# Patient Record
Sex: MALE | Race: WHITE
[De-identification: names, ages, dates, MRNs, and addresses within clinical notes are randomized per-mention and may not be internally consistent; named-entity substitution may affect disease eponyms.]

---

## 2022-01-23 ENCOUNTER — HOSPITAL ENCOUNTER (EMERGENCY)
Dept: HOSPITAL 94 - ER | Age: 32
Discharge: LEFT BEFORE DISCHARGE | End: 2022-01-23
Payer: MEDICAID

## 2022-01-23 DIAGNOSIS — Z72.89: ICD-10-CM
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
## 2022-01-23 DIAGNOSIS — T16.2XXA: Primary | ICD-10-CM
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
## 2022-01-23 DIAGNOSIS — T16.1XXA: ICD-10-CM

## 2022-01-23 DIAGNOSIS — Z59.00: ICD-10-CM
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
## 2022-01-23 PROCEDURE — 99281 EMR DPT VST MAYX REQ PHY/QHP: CPT

## 2022-01-23 SDOH — ECONOMIC STABILITY - HOUSING INSECURITY: HOMELESSNESS UNSPECIFIED: Z59.00

## 2022-02-06 ENCOUNTER — HOSPITAL ENCOUNTER (EMERGENCY)
Dept: HOSPITAL 94 - ER | Age: 32
LOS: 1 days | Discharge: HOME | End: 2022-02-07
Payer: MEDICAID

## 2022-02-06 VITALS — WEIGHT: 143.74 LBS | BODY MASS INDEX: 21.78 KG/M2 | HEIGHT: 68 IN

## 2022-02-06 DIAGNOSIS — Z59.00: ICD-10-CM

## 2022-02-06 DIAGNOSIS — F17.210: ICD-10-CM

## 2022-02-06 DIAGNOSIS — Z20.822: ICD-10-CM

## 2022-02-06 DIAGNOSIS — F20.9: Primary | ICD-10-CM

## 2022-02-06 LAB
ALBUMIN SERPL BCP-MCNC: 4.5 G/DL (ref 3.4–5)
ALBUMIN/GLOB SERPL: 1.6 {RATIO} (ref 1.1–1.5)
ALP SERPL-CCNC: 83 IU/L (ref 46–116)
ALT SERPL W P-5'-P-CCNC: 28 U/L (ref 12–78)
AMPHETAMINES UR QL SCN: POSITIVE
ANION GAP SERPL CALCULATED.3IONS-SCNC: 9 MMOL/L (ref 8–16)
AST SERPL W P-5'-P-CCNC: 18 U/L (ref 10–37)
BARBITURATES UR QL SCN: NEGATIVE
BASOPHILS # BLD AUTO: 0.1 X10'3 (ref 0–0.2)
BASOPHILS NFR BLD AUTO: 0.7 % (ref 0–1)
BENZODIAZ UR QL SCN: NEGATIVE
BILIRUB SERPL-MCNC: 0.7 MG/DL (ref 0.1–1)
BUN SERPL-MCNC: 12 MG/DL (ref 7–18)
BUN/CREAT SERPL: 15 (ref 5.4–32)
BZE UR QL SCN: NEGATIVE
CALCIUM SERPL-MCNC: 9 MG/DL (ref 8.5–10.1)
CANNABINOIDS UR QL SCN: POSITIVE
CHLORIDE SERPL-SCNC: 104 MMOL/L (ref 99–107)
CO2 SERPL-SCNC: 27.6 MMOL/L (ref 24–32)
CREAT SERPL-MCNC: 0.8 MG/DL (ref 0.6–1.1)
EOSINOPHIL # BLD AUTO: 0.3 X10'3 (ref 0–0.9)
EOSINOPHIL NFR BLD AUTO: 3.2 % (ref 0–6)
ERYTHROCYTE [DISTWIDTH] IN BLOOD BY AUTOMATED COUNT: 13.9 % (ref 11.5–14.5)
GFR SERPL CREATININE-BSD FRML MDRD: > 90 ML/MIN
GLUCOSE SERPL-MCNC: 97 MG/DL (ref 70–104)
HCT VFR BLD AUTO: 41.5 % (ref 42–52)
HGB BLD-MCNC: 14 G/DL (ref 14–17.9)
LYMPHOCYTES # BLD AUTO: 2.5 X10'3 (ref 1.1–4.8)
LYMPHOCYTES NFR BLD AUTO: 30.5 % (ref 21–51)
MCH RBC QN AUTO: 29.7 PG (ref 27–31)
MCHC RBC AUTO-ENTMCNC: 33.8 G/DL (ref 33–36.5)
MCV RBC AUTO: 87.9 FL (ref 78–98)
METHADONE UR QL SCN: NEGATIVE
MONOCYTES # BLD AUTO: 1 X10'3 (ref 0–0.9)
MONOCYTES NFR BLD AUTO: 12.3 % (ref 2–12)
NEUTROPHILS # BLD AUTO: 4.4 X10'3 (ref 1.8–7.7)
NEUTROPHILS NFR BLD AUTO: 53.3 % (ref 42–75)
OPIATES UR QL SCN: NEGATIVE
PCP UR QL SCN: NEGATIVE
PLATELET # BLD AUTO: 227 X10'3 (ref 140–440)
PMV BLD AUTO: 7.7 FL (ref 7.4–10.4)
POTASSIUM SERPL-SCNC: 3.6 MMOL/L (ref 3.5–5.1)
PROT SERPL-MCNC: 7.4 G/DL (ref 6.4–8.2)
RBC # BLD AUTO: 4.73 X10'6 (ref 4.7–6.1)
SODIUM SERPL-SCNC: 141 MMOL/L (ref 135–145)
WBC # BLD AUTO: 8.2 X10'3 (ref 4.5–11)

## 2022-02-06 PROCEDURE — 84443 ASSAY THYROID STIM HORMONE: CPT

## 2022-02-06 PROCEDURE — 36415 COLL VENOUS BLD VENIPUNCTURE: CPT

## 2022-02-06 PROCEDURE — 80305 DRUG TEST PRSMV DIR OPT OBS: CPT

## 2022-02-06 PROCEDURE — 80053 COMPREHEN METABOLIC PANEL: CPT

## 2022-02-06 PROCEDURE — 99285 EMERGENCY DEPT VISIT HI MDM: CPT

## 2022-02-06 PROCEDURE — 87635 SARS-COV-2 COVID-19 AMP PRB: CPT

## 2022-02-06 PROCEDURE — 85025 COMPLETE CBC W/AUTO DIFF WBC: CPT

## 2022-02-06 SDOH — ECONOMIC STABILITY - HOUSING INSECURITY: HOMELESSNESS UNSPECIFIED: Z59.00

## 2022-02-06 NOTE — NUR
The patient moved to bed 24 from the main ER. He was very cooperative with the 
nursing assessment. Reports that he is here for "seizures" that he told the 
triage RN that they were caused by a "stimulator display" He denies having a 
mental illness. He denies having thoughts to harm himself or others. He denies 
ever being diagnoised with a mental illness. He takes no psychiatric 
medications. He stated that he lives a transient life style traveling the 
country. He stated that he has beeb staying at the [facility] at times. He readily 
admits to polysubstance abuse. His drug screen was positive for amphetamines. 
He reports also using THC and occassional ETOH

## 2022-02-07 VITALS — SYSTOLIC BLOOD PRESSURE: 94 MMHG | DIASTOLIC BLOOD PRESSURE: 59 MMHG

## 2022-02-07 NOTE — NUR
Met with patient in regards to substance use and to see if patient was 
interested in treatment options. Patient would like to go to inpatient rehab. I 
gave patient [facility] number to start process and my business card in case he 
has any questions.

## 2022-02-10 ENCOUNTER — HOSPITAL ENCOUNTER (EMERGENCY)
Dept: HOSPITAL 94 - ER | Age: 32
Discharge: HOME | End: 2022-02-10
Payer: MEDICAID

## 2022-02-10 VITALS — BODY MASS INDEX: 22.78 KG/M2 | HEIGHT: 68 IN | WEIGHT: 150.31 LBS

## 2022-02-10 VITALS — SYSTOLIC BLOOD PRESSURE: 104 MMHG | DIASTOLIC BLOOD PRESSURE: 60 MMHG

## 2022-02-10 DIAGNOSIS — F15.90: ICD-10-CM

## 2022-02-10 DIAGNOSIS — R56.9: ICD-10-CM

## 2022-02-10 DIAGNOSIS — F20.9: ICD-10-CM

## 2022-02-10 DIAGNOSIS — Z72.89: ICD-10-CM

## 2022-02-10 DIAGNOSIS — F12.90: ICD-10-CM

## 2022-02-10 DIAGNOSIS — Z13.89: Primary | ICD-10-CM

## 2022-02-10 DIAGNOSIS — Z59.00: ICD-10-CM

## 2022-02-10 PROCEDURE — 99281 EMR DPT VST MAYX REQ PHY/QHP: CPT

## 2022-02-10 SDOH — ECONOMIC STABILITY - HOUSING INSECURITY: HOMELESSNESS UNSPECIFIED: Z59.00
